# Patient Record
Sex: FEMALE | Race: WHITE | NOT HISPANIC OR LATINO
[De-identification: names, ages, dates, MRNs, and addresses within clinical notes are randomized per-mention and may not be internally consistent; named-entity substitution may affect disease eponyms.]

---

## 2024-07-26 PROBLEM — Z00.00 ENCOUNTER FOR PREVENTIVE HEALTH EXAMINATION: Status: ACTIVE | Noted: 2024-07-26

## 2024-07-29 ENCOUNTER — APPOINTMENT (OUTPATIENT)
Dept: ORTHOPEDIC SURGERY | Facility: CLINIC | Age: 84
End: 2024-07-29

## 2024-08-02 ENCOUNTER — APPOINTMENT (OUTPATIENT)
Dept: ORTHOPEDIC SURGERY | Facility: CLINIC | Age: 84
End: 2024-08-02

## 2024-08-02 DIAGNOSIS — M40.209 UNSPECIFIED KYPHOSIS, SITE UNSPECIFIED: ICD-10-CM

## 2024-08-02 PROCEDURE — 99202 OFFICE O/P NEW SF 15 MIN: CPT

## 2024-08-02 NOTE — PHYSICAL EXAM
[de-identified] : On exam elderly female in no acute distress she is very pleasant and cooperative.  She has a flatback deformity and I flexed forward posture.  Her lumbar spine is very stiff in the lordosis is not recreatable with extension.  However the strength in her legs is grossly preserved. [de-identified] : No imaging studies available

## 2024-08-02 NOTE — HISTORY OF PRESENT ILLNESS
[de-identified] : 83-year-old female presents complaining of kyphosis.  She was diagnosed about 6 or 7 years ago.  She treats with Dr. Phillip at [facility].  She was recommended to try a brace.  She was given an off-the-shelf orthosis which she finds does not fit properly and does not really help in terms of kyphosis.  Is not a pain issue it is more of a cosmetic issue in that she feels like she is walking bent forward and not a problem for her.  She denies any significant back pain or leg pain.

## 2024-08-02 NOTE — ASSESSMENT
[FreeTextEntry1] : Mrs. Gonzalez really wants to try a custom brace to see if it would be of assistance to her.  That her primary reason for visit.  She is not here for surgical consultation etc.  She is undergoing physical therapy at [facility].  I recommend she continue with that and mention to her therapist Bertha style extension based exercises.  She was given a prescription for a custom molded TLSO extension type brace.  She was given the phone number for [facility].  All of her questions were addressed